# Patient Record
Sex: MALE | Race: WHITE | NOT HISPANIC OR LATINO | URBAN - METROPOLITAN AREA
[De-identification: names, ages, dates, MRNs, and addresses within clinical notes are randomized per-mention and may not be internally consistent; named-entity substitution may affect disease eponyms.]

---

## 2024-09-12 ENCOUNTER — APPOINTMENT (OUTPATIENT)
Dept: URBAN - METROPOLITAN AREA CLINIC 204 | Age: 79
Setting detail: DERMATOLOGY
End: 2024-09-13

## 2024-09-12 PROCEDURE — OTHER MIPS QUALITY: OTHER

## 2024-09-12 PROCEDURE — 17000 DESTRUCT PREMALG LESION: CPT

## 2024-09-12 PROCEDURE — 17003 DESTRUCT PREMALG LES 2-14: CPT

## 2024-09-12 PROCEDURE — 99213 OFFICE O/P EST LOW 20 MIN: CPT | Mod: 25

## 2025-03-31 PROCEDURE — 93010 ELECTROCARDIOGRAM REPORT: CPT | Performed by: INTERNAL MEDICINE
